# Patient Record
Sex: FEMALE | Race: WHITE | URBAN - METROPOLITAN AREA
[De-identification: names, ages, dates, MRNs, and addresses within clinical notes are randomized per-mention and may not be internally consistent; named-entity substitution may affect disease eponyms.]

---

## 2022-11-17 ENCOUNTER — OFFICE VISIT (OUTPATIENT)
Dept: URBAN - METROPOLITAN AREA CLINIC 26 | Facility: CLINIC | Age: 67
End: 2022-11-17
Payer: MEDICARE

## 2022-11-17 DIAGNOSIS — H52.4 PRESBYOPIA: ICD-10-CM

## 2022-11-17 DIAGNOSIS — H25.13 AGE-RELATED NUCLEAR CATARACT, BILATERAL: ICD-10-CM

## 2022-11-17 DIAGNOSIS — H16.141 PUNCTATE KERATITIS OF RIGHT EYE: Primary | ICD-10-CM

## 2022-11-17 PROCEDURE — 92004 COMPRE OPH EXAM NEW PT 1/>: CPT | Performed by: OPTOMETRIST

## 2022-11-17 PROCEDURE — 92134 CPTRZ OPH DX IMG PST SGM RTA: CPT | Performed by: OPTOMETRIST

## 2022-11-17 ASSESSMENT — KERATOMETRY
OD: 46.13
OS: 46.63

## 2022-11-17 ASSESSMENT — INTRAOCULAR PRESSURE
OD: 16
OS: 18

## 2022-11-17 ASSESSMENT — VISUAL ACUITY
OD: 20/20
OS: 20/20

## 2022-11-17 NOTE — IMPRESSION/PLAN
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 Plan: rx maxitrol qid OD x 1 week (sample given). Recommend at's 4-5 times a day OU. monitor. rtc 1 year, sooner if NI or worsens.